# Patient Record
Sex: FEMALE | Race: WHITE
[De-identification: names, ages, dates, MRNs, and addresses within clinical notes are randomized per-mention and may not be internally consistent; named-entity substitution may affect disease eponyms.]

---

## 2018-03-23 ENCOUNTER — HOSPITAL ENCOUNTER (EMERGENCY)
Dept: HOSPITAL 17 - NEPC | Age: 53
Discharge: LEFT BEFORE DISCHARGE | End: 2018-03-23
Payer: MEDICARE

## 2018-03-23 VITALS — RESPIRATION RATE: 18 BRPM | OXYGEN SATURATION: 99 % | HEART RATE: 70 BPM

## 2018-03-23 VITALS
DIASTOLIC BLOOD PRESSURE: 109 MMHG | TEMPERATURE: 98.7 F | OXYGEN SATURATION: 100 % | SYSTOLIC BLOOD PRESSURE: 183 MMHG | HEART RATE: 61 BPM | RESPIRATION RATE: 18 BRPM

## 2018-03-23 VITALS — HEIGHT: 62 IN | WEIGHT: 149.91 LBS | BODY MASS INDEX: 27.59 KG/M2

## 2018-03-23 DIAGNOSIS — R94.31: ICD-10-CM

## 2018-03-23 DIAGNOSIS — R07.9: Primary | ICD-10-CM

## 2018-03-23 DIAGNOSIS — Z53.20: ICD-10-CM

## 2018-03-23 DIAGNOSIS — I10: ICD-10-CM

## 2018-03-23 LAB
BASOPHILS # BLD AUTO: 0.1 TH/MM3 (ref 0–0.2)
BASOPHILS NFR BLD: 1.1 % (ref 0–2)
BUN SERPL-MCNC: 14 MG/DL (ref 7–18)
CALCIUM SERPL-MCNC: 8.9 MG/DL (ref 8.5–10.1)
CHLORIDE SERPL-SCNC: 108 MEQ/L (ref 98–107)
CREAT SERPL-MCNC: 0.82 MG/DL (ref 0.5–1)
EOSINOPHIL # BLD: 0.1 TH/MM3 (ref 0–0.4)
EOSINOPHIL NFR BLD: 2.1 % (ref 0–4)
ERYTHROCYTE [DISTWIDTH] IN BLOOD BY AUTOMATED COUNT: 14.4 % (ref 11.6–17.2)
GFR SERPLBLD BASED ON 1.73 SQ M-ARVRAT: 73 ML/MIN (ref 89–?)
GLUCOSE SERPL-MCNC: 72 MG/DL (ref 74–106)
HCO3 BLD-SCNC: 31 MEQ/L (ref 21–32)
HCT VFR BLD CALC: 36.1 % (ref 35–46)
HGB BLD-MCNC: 11.9 GM/DL (ref 11.6–15.3)
INR PPP: 1 RATIO
LYMPHOCYTES # BLD AUTO: 2.9 TH/MM3 (ref 1–4.8)
LYMPHOCYTES NFR BLD AUTO: 41.9 % (ref 9–44)
MCH RBC QN AUTO: 27 PG (ref 27–34)
MCHC RBC AUTO-ENTMCNC: 32.8 % (ref 32–36)
MCV RBC AUTO: 82.2 FL (ref 80–100)
MONOCYTE #: 0.4 TH/MM3 (ref 0–0.9)
MONOCYTES NFR BLD: 6.1 % (ref 0–8)
NEUTROPHILS # BLD AUTO: 3.4 TH/MM3 (ref 1.8–7.7)
NEUTROPHILS NFR BLD AUTO: 48.8 % (ref 16–70)
PLATELET # BLD: 206 TH/MM3 (ref 150–450)
PMV BLD AUTO: 10.2 FL (ref 7–11)
PROTHROMBIN TIME: 10.1 SEC (ref 9.8–11.6)
RBC # BLD AUTO: 4.39 MIL/MM3 (ref 4–5.3)
SODIUM SERPL-SCNC: 144 MEQ/L (ref 136–145)
TROPONIN I SERPL-MCNC: (no result) NG/ML (ref 0.02–0.05)
WBC # BLD AUTO: 6.9 TH/MM3 (ref 4–11)

## 2018-03-23 PROCEDURE — 80048 BASIC METABOLIC PNL TOTAL CA: CPT

## 2018-03-23 PROCEDURE — 71046 X-RAY EXAM CHEST 2 VIEWS: CPT

## 2018-03-23 PROCEDURE — 85610 PROTHROMBIN TIME: CPT

## 2018-03-23 PROCEDURE — 93005 ELECTROCARDIOGRAM TRACING: CPT

## 2018-03-23 PROCEDURE — 85730 THROMBOPLASTIN TIME PARTIAL: CPT

## 2018-03-23 PROCEDURE — 83735 ASSAY OF MAGNESIUM: CPT

## 2018-03-23 PROCEDURE — 85025 COMPLETE CBC W/AUTO DIFF WBC: CPT

## 2018-03-23 PROCEDURE — 82550 ASSAY OF CK (CPK): CPT

## 2018-03-23 PROCEDURE — 84484 ASSAY OF TROPONIN QUANT: CPT

## 2018-03-23 PROCEDURE — 99285 EMERGENCY DEPT VISIT HI MDM: CPT

## 2018-03-23 NOTE — PD
HPI


Chief Complaint:  Chest Pain


Time Seen by Provider:  11:40


Travel History


International Travel<30 days:  No


Contact w/Intl Traveler<30days:  No


Traveled to known affect area:  No





History of Present Illness


HPI


52-year-old female presents with central chest pain that started Saturday.  She 

states it goes down her left arm.  She states that it is intermittent in 

nature.  She denies any other associated complaints except back pain after she 

had an accident a couple weeks ago.  She states that the chest pain has just 

started this Saturday.  She denies recurrent history of this.  She denies 

taking an aspirin.  She denies following with a prior cardiac specialist or 

having routine cardiac workup.  Quality is pressure.  Severity is currently 

resolved.  She states that episodes are intermittent in nature.





[facility]


Past Medical History


Hypertension:  Yes





Past Surgical History


Surgical History:  No Previous Surgery





Social History


Tobacco Use:  No





Allergies-Medications


(Allergen,Severity, Reaction):  


Coded Allergies:  


     aspirin (Verified  Allergy, Intermediate, 3/23/18)


     acetaminophen (Verified  Allergy, Mild, itch, 3/23/18)


     hydrocodone (Verified  Allergy, Mild, itch, 3/23/18)


Reported Meds & Prescriptions





Reported Meds & Active Scripts


Active


Reported


Lyrica (Pregabalin) 150 Mg Cap 150 Mg PO BID


Lisinopril 40 Mg Tab 40 Mg PO DAILY








Review of Systems


Except as stated in HPI:  all other systems reviewed are Neg





Physical Exam


Narrative


GENERAL: 51 y/o female in no apparent distress


SKIN: Focused skin assessment warm/dry.


HEAD: Atraumatic. Normocephalic. 


EYES: Pupils equal and round. No scleral icterus. No injection or drainage. 


ENT: No nasal bleeding or discharge.  Mucous membranes pink and moist.


NECK: Trachea midline.


CARDIOVASCULAR: Regular rate and rhythm.  


RESPIRATORY: No accessory muscle use. Clear to auscultation. Breath sounds 

equal bilaterally. 


GASTROINTESTINAL: Abdomen soft, non-tender, nondistended. 


MUSCULOSKELETAL: No obvious deformities. No clubbing.  No cyanosis. 


NEUROLOGICAL: Awake and alert. No obvious cranial nerve deficits.  Motor 

grossly within normal limits. Normal speech.





Data


Data


Last Documented VS





Vital Signs








  Date Time  Temp Pulse Resp B/P (MAP) Pulse Ox O2 Delivery O2 Flow Rate FiO2


 


3/23/18 12:57  56 18  100   


 


3/23/18 09:37 98.7   183/109 (133)    








Orders





 Orders


Electrocardiogram (3/23/18 09:34)


Complete Blood Count With Diff (3/23/18 09:34)


Basic Metabolic Panel (Bmp) (3/23/18 09:34)


Ckmb (Isoenzyme) Profile (3/23/18 09:34)


Troponin I (3/23/18 09:34)


Magnesium (Mg) (3/23/18 11:40)


Prothrombin Time / Inr (Pt) (3/23/18 11:40)


Act Partial Throm Time (Ptt) (3/23/18 11:40)


Ecg Monitoring (3/23/18 11:40)


Bilateral Bp Monitoring (3/23/18 11:40)


Iv Access Insert/Monitor (3/23/18 11:40)


Oximetry (3/23/18 11:40)


Aspirin (Aspirin) (3/23/18 11:45)


Sodium Chloride 0.9% Flush (Ns Flush) (3/23/18 11:45)


Chest, Pa & Lat (3/23/18 11:40)





Labs





Laboratory Tests








Test


  3/23/18


13:00


 


White Blood Count 6.9 TH/MM3 


 


Red Blood Count 4.39 MIL/MM3 


 


Hemoglobin 11.9 GM/DL 


 


Hematocrit 36.1 % 


 


Mean Corpuscular Volume 82.2 FL 


 


Mean Corpuscular Hemoglobin 27.0 PG 


 


Mean Corpuscular Hemoglobin


Concent 32.8 % 


 


 


Red Cell Distribution Width 14.4 % 


 


Platelet Count 206 TH/MM3 


 


Mean Platelet Volume 10.2 FL 


 


Neutrophils (%) (Auto) 48.8 % 


 


Lymphocytes (%) (Auto) 41.9 % 


 


Monocytes (%) (Auto) 6.1 % 


 


Eosinophils (%) (Auto) 2.1 % 


 


Basophils (%) (Auto) 1.1 % 


 


Neutrophils # (Auto) 3.4 TH/MM3 


 


Lymphocytes # (Auto) 2.9 TH/MM3 


 


Monocytes # (Auto) 0.4 TH/MM3 


 


Eosinophils # (Auto) 0.1 TH/MM3 


 


Basophils # (Auto) 0.1 TH/MM3 


 


CBC Comment DIFF FINAL 


 


Differential Comment  


 


Prothrombin Time 10.1 SEC 


 


Prothromb Time International


Ratio 1.0 RATIO 


 


 


Activated Partial


Thromboplast Time 22.0 SEC 


 


 


Blood Urea Nitrogen 14 MG/DL 


 


Creatinine 0.82 MG/DL 


 


Random Glucose 72 MG/DL 


 


Calcium Level 8.9 MG/DL 


 


Sodium Level 144 MEQ/L 


 


Potassium Level 4.0 MEQ/L 


 


Chloride Level 108 MEQ/L 


 


Carbon Dioxide Level 31.0 MEQ/L 


 


Anion Gap 5 MEQ/L 


 


Estimat Glomerular Filtration


Rate 73 ML/MIN 


 


 


Magnesium Level 2.4 MG/DL 


 


Total Creatine Kinase 47 U/L 


 


Troponin I


  LESS THAN 0.02


NG/ML











MDM


Medical Decision Making


Medical Screen Exam Complete:  Yes


Emergency Medical Condition:  Yes


Medical Record Reviewed:  Yes ([facility] confirmed)


Interpretation(s)


EKG shows NSR, no ST elevation or depression, and no arrhythmias.  No  

significant T-wave inversions.


cxr no acute


CBC & BMP Diagram


3/23/18 13:00








Calcium Level 8.9








Last 24 hours Impressions








Chest X-Ray 3/23/18 1140 Signed





Impressions: 





 Service Date/Time:  Friday, March 23, 2018 11:54 - CONCLUSION:  No acute 





 cardiopulmonary abnormality is identified.     Matt Pepe MD 








Differential Diagnosis


Musculoskeletal, gastritis, atypical cardiac


Narrative Course


Will check blood work, chest x-ray and reevaluate. aspirin held as patient told 

staff she was allergic to aspirin





While I was with a critical patient patient elected to leave without me talking 

with her further with her IV in place.  She was just witnessed walking out by 

registration and could not be stopped.  Nursing staff will call police as she 

still has IV in place and left AMA





Diagnosis





 Primary Impression:  


 Chest pain


 Qualified Codes:  R07.9 - Chest pain, unspecified


Disposition:  07 AGAINST MEDICAL ADVICE


Condition:  Stable











Leila Jeffery MD Mar 23, 2018 12:03

## 2018-03-23 NOTE — RADRPT
EXAM DATE/TIME:  03/23/2018 11:54 

 

[facility] COMPARISON:     

No previous studies available for comparison.

 

                     

INDICATIONS :     

Chest pains x1 week upper left under clavicle, with shortness of breath,

                     

 

MEDICAL HISTORY :     

Hypertension.          

 

SURGICAL HISTORY :     

None.   

 

ENCOUNTER:     

Initial                                        

 

ACUITY:     

1 week      

 

PAIN SCORE:     

3/10

 

LOCATION:     

Left chest 

 

FINDINGS:     

Portable AP view of the chest demonstrates a normal-sized cardiac silhouette. The lungs demonstrate n
o definite effusion, consolidation, or pneumothorax. The bones and soft tissues demonstrate no acute 
finding. EKG lines overlie the patient.

 

CONCLUSION:     

No acute cardiopulmonary abnormality is identified.

 

 

 

 Matt Pepe MD on March 23, 2018 at 12:11           

Board Certified Radiologist.

 This report was verified electronically.

## 2018-03-24 NOTE — EKG
Date Performed: 03/23/2018       Time Performed: 10:04:09

 

PTAGE:      52 years

 

EKG:      SINUS BRADYCARDIA BORDERLINE ECG 

 

NO PREVIOUS TRACING            

 

DOCTOR:   João Bowles  Interpretating Date/Time  03/24/2018 19:13:25